# Patient Record
Sex: FEMALE | Race: BLACK OR AFRICAN AMERICAN | Employment: FULL TIME | URBAN - METROPOLITAN AREA
[De-identification: names, ages, dates, MRNs, and addresses within clinical notes are randomized per-mention and may not be internally consistent; named-entity substitution may affect disease eponyms.]

---

## 2020-02-18 PROBLEM — N91.1 SECONDARY AMENORRHEA: Status: ACTIVE | Noted: 2020-02-18

## 2020-02-18 PROBLEM — E04.2 MULTINODULAR GOITER: Status: ACTIVE | Noted: 2020-02-18

## 2020-02-18 PROBLEM — E22.1 HYPERPROLACTINEMIA (HCC): Status: ACTIVE | Noted: 2020-02-18

## 2020-02-18 PROBLEM — D35.2 PITUITARY ADENOMA (HCC): Status: ACTIVE | Noted: 2020-02-18

## 2020-03-03 ENCOUNTER — HOSPITAL ENCOUNTER (OUTPATIENT)
Dept: MRI IMAGING | Age: 30
Discharge: HOME | End: 2020-03-03
Attending: INTERNAL MEDICINE
Payer: COMMERCIAL

## 2020-03-03 DIAGNOSIS — E22.1 HYPERPROLACTINEMIA (HCC): ICD-10-CM

## 2020-03-03 DIAGNOSIS — D35.2 PITUITARY ADENOMA (HCC): ICD-10-CM

## 2020-03-03 PROCEDURE — 74011000258 HC RX REV CODE- 258: Performed by: INTERNAL MEDICINE

## 2020-03-03 PROCEDURE — 74011250636 HC RX REV CODE- 250/636: Performed by: INTERNAL MEDICINE

## 2020-03-03 PROCEDURE — A9575 INJ GADOTERATE MEGLUMI 0.1ML: HCPCS | Performed by: INTERNAL MEDICINE

## 2020-03-03 PROCEDURE — 70553 MRI BRAIN STEM W/O & W/DYE: CPT

## 2020-03-03 RX ORDER — GADOTERATE MEGLUMINE 376.9 MG/ML
17 INJECTION INTRAVENOUS
Status: COMPLETED | OUTPATIENT
Start: 2020-03-03 | End: 2020-03-03

## 2020-03-03 RX ORDER — SODIUM CHLORIDE 0.9 % (FLUSH) 0.9 %
10 SYRINGE (ML) INJECTION
Status: COMPLETED | OUTPATIENT
Start: 2020-03-03 | End: 2020-03-03

## 2020-03-03 RX ADMIN — GADOTERATE MEGLUMINE 17 ML: 376.9 INJECTION INTRAVENOUS at 21:17

## 2020-03-03 RX ADMIN — Medication 10 ML: at 21:16

## 2020-03-03 RX ADMIN — SODIUM CHLORIDE 100 ML: 900 INJECTION, SOLUTION INTRAVENOUS at 21:16

## 2020-03-04 NOTE — PROGRESS NOTES
Her MRI revealed that the pituitary tumor measuring 9 mm. This is slightly larger than her prior study, although I suspect that it will decrease in size with cabergoline treatment.

## 2021-09-29 ENCOUNTER — HOSPITAL ENCOUNTER (OUTPATIENT)
Dept: MRI IMAGING | Age: 31
Discharge: HOME | End: 2021-09-29
Attending: INTERNAL MEDICINE
Payer: COMMERCIAL

## 2021-09-29 DIAGNOSIS — D35.2 PITUITARY ADENOMA (HCC): ICD-10-CM

## 2021-09-29 DIAGNOSIS — E22.1 HYPERPROLACTINEMIA (HCC): ICD-10-CM

## 2021-09-29 PROCEDURE — 74011250636 HC RX REV CODE- 250/636: Performed by: INTERNAL MEDICINE

## 2021-09-29 PROCEDURE — 70553 MRI BRAIN STEM W/O & W/DYE: CPT

## 2021-09-29 PROCEDURE — A9576 INJ PROHANCE MULTIPACK: HCPCS | Performed by: INTERNAL MEDICINE

## 2021-09-29 RX ORDER — SODIUM CHLORIDE 0.9 % (FLUSH) 0.9 %
10 SYRINGE (ML) INJECTION
Status: COMPLETED | OUTPATIENT
Start: 2021-09-29 | End: 2021-09-29

## 2021-09-29 RX ADMIN — GADOTERIDOL 17 ML: 279.3 INJECTION, SOLUTION INTRAVENOUS at 19:56

## 2021-09-29 RX ADMIN — Medication 10 ML: at 19:56

## 2022-01-31 PROBLEM — N91.4 SECONDARY OLIGOMENORRHEA: Status: ACTIVE | Noted: 2022-01-31

## 2022-03-18 PROBLEM — N91.4 SECONDARY OLIGOMENORRHEA: Status: ACTIVE | Noted: 2022-01-31

## 2022-03-19 PROBLEM — E22.1 HYPERPROLACTINEMIA (HCC): Status: ACTIVE | Noted: 2020-02-18

## 2022-03-19 PROBLEM — N91.1 SECONDARY AMENORRHEA: Status: ACTIVE | Noted: 2020-02-18

## 2022-03-19 PROBLEM — E04.2 MULTINODULAR GOITER: Status: ACTIVE | Noted: 2020-02-18

## 2022-03-19 PROBLEM — D35.2 PITUITARY ADENOMA (HCC): Status: ACTIVE | Noted: 2020-02-18

## 2022-05-26 DIAGNOSIS — E22.1 HYPERPROLACTINEMIA (HCC): Primary | ICD-10-CM

## 2022-07-12 DIAGNOSIS — E22.1 HYPERPROLACTINEMIA (HCC): ICD-10-CM

## 2022-07-13 ENCOUNTER — OFFICE VISIT (OUTPATIENT)
Dept: ENDOCRINOLOGY | Age: 32
End: 2022-07-13
Payer: COMMERCIAL

## 2022-07-13 VITALS
WEIGHT: 183 LBS | DIASTOLIC BLOOD PRESSURE: 78 MMHG | HEART RATE: 97 BPM | SYSTOLIC BLOOD PRESSURE: 124 MMHG | BODY MASS INDEX: 27.74 KG/M2 | OXYGEN SATURATION: 98 % | HEIGHT: 68 IN

## 2022-07-13 DIAGNOSIS — D35.2 PITUITARY ADENOMA (HCC): Primary | ICD-10-CM

## 2022-07-13 DIAGNOSIS — E22.1 HYPERPROLACTINEMIA (HCC): ICD-10-CM

## 2022-07-13 DIAGNOSIS — N92.6 IRREGULAR MENSES: ICD-10-CM

## 2022-07-13 DIAGNOSIS — E04.2 MULTINODULAR GOITER: ICD-10-CM

## 2022-07-13 LAB — PROLACTIN SERPL-MCNC: 63.6 NG/ML

## 2022-07-13 PROCEDURE — 99214 OFFICE O/P EST MOD 30 MIN: CPT | Performed by: INTERNAL MEDICINE

## 2022-07-13 RX ORDER — CABERGOLINE 0.5 MG/1
0.5 TABLET ORAL DAILY
Qty: 90 TABLET | Refills: 3 | Status: SHIPPED | OUTPATIENT
Start: 2022-07-13

## 2022-07-13 RX ORDER — FLUTICASONE PROPIONATE 50 MCG
SPRAY, SUSPENSION (ML) NASAL
COMMUNITY
Start: 2021-11-12 | End: 2022-07-13

## 2022-07-13 ASSESSMENT — ENCOUNTER SYMPTOMS
NAUSEA: 0
DIARRHEA: 0
VOMITING: 0
CONSTIPATION: 0

## 2022-07-13 NOTE — PROGRESS NOTES
Mikey Garcia MD, [facility]  [address], [address] [facility], [address] [place] [address] [place]  Phone [phone]  Facsimile [phone]          Paty Singh is a 27 y.o. female seen 7/13/2022 for follow up of hyperprolactinemia        ASSESSMENT AND PLAN:    1. Pituitary adenoma ([address]  Clinically she has a prolactinoma. Briseida Fontanez most recent MRI 9/2021 revealed that the microadenoma was stable since the prior study. Her IGF-1 level was mildly elevated in 2/2020 but was normal 12/2020.    2. Hyperprolactinemia ([address]  Her prolactin level remains elevated and I will increase her cabergoline as below. She is requiring a high dose of cabergoline and still does not have adequate control of her hyperprolactinemia. I have therefore recommended that she strongly consider transsphenoidal resection of her prolactinoma. - cabergoline (DOSTINEX) 0.5 MG tablet; Take 1 tablet by mouth daily  Dispense: 90 tablet; Refill: 3    3. Irregular menses  She stopped her oral contraceptive pills. It would be interesting to see if her menses normalize with correction of her hyperprolactinemia. 4. Multinodular goiter  Thyroid ultrasound performed 9/2021 again revealed the presence of multiple small nodules bilaterally without suspicious sonographic features. I will likely not pursue further imaging unless there is a change in her physical examination and/or symptoms. Follow-up and Dispositions    · Return in about 4 months (around 11/13/2022). HISTORY OF PRESENT ILLNESS:    PITUITARY DISEASE     Presentation/Diagnosis/Treatment: Prolactinoma diagnosed in 2016 during the evaluation of amenorrhea.  She was previously followed by [facility] reproductive endocrinology.     Symptoms: She has a history of migraines and reports occasional headaches.   Denies tunnel vision, diplopia.  Denies galactorrhea. Isha Miguel has a history of amenorrhea.  She started OCPs 8/2020 and was having irregular menses.  She stopped OCPs around 11/2021 and her menses have been irregular (more often). No known history of infertility but she has never tried to get pregnant.  Denies change in facial appearance, change in ring/shoe size.       Treatment: Cabergoline re-started 2/2020.     Imaging:  MRI brain with and without contrast 2/16/2016: Hypoenhancing 9 mm intrasellar lesion to the left of midline located inferiorly, likely an adenoma.  The optic chiasm is normal.  Cavernous sinuses enhance symmetrically. MRI brain with and without contrast 9/27/2016: Lesion in the floor of the sella measuring approximately 7 x 3 mm (compared to 8.5 x 4 mm on previous study). MRI brain with and without contrast 3/4/2020: Hypoenhancing area in the left lateral inferior anterior sella measuring 9 mm. MRI brain with and without contrast 9/29/2021: There is an oval 0.8 cm hyperenhancing focus in the inferior/left aspect of the pituitary gland, unchanged.      Labs:  2/1/2016: Prolactin 241.9, estradiol 9.6, FSH 6.0, LH 5.2, TSH 0.872.  1/20/2020: Prolactin 163.1, creatinine 1.0 (GFR 78). 2/18/2020: Prolactin 153.6, estradiol 20.0, FSH 7.0, LH 9.0, IGF-1 280 (), TSH 0.988, free T4 0.94.  6/24/2020: Prolactin 129.5, beta hCG negative. 12/10/2020: Prolactin 95.5, IGF-1 229.  9/1/2021: Prolactin 113.0.  1/31/2022: Prolactin 19.0.  7/12/2022: Prolactin 63. 6.     Pregnancy status: No pregnancy plans.          THYROID NODULE / MULTINODULAR GOITER     Presentation: Thyromegaly noted on examination.     Thyroid Cancer Risk Deatra Ream is no family history of thyroid cancer. Omelia Sovereign is no history of radiation to the head/neck.      Symptoms:  Denies anterior neck enlargement/pain/pressure.  Denies dysphagia, positional shortness of breath, hoarseness.     Imaging:  Thyroid ultrasound 2/18/2020: Right lobe 1.76 x 2.01 x 6.44 cm, homogeneous echotexture.  In the inferior right lobe there is a complex nodule measuring 0.38 x 0.54 x 0.56 cm.  Just inferiorly is a cyst measuring 0.40 x 0.50 x 0.45 cm.  Isthmus measures 0.63 cm.  Left lobe 1.62 x 1.96 x 6.15 cm, homogeneous echotexture.  In the inferior left lobe posteriorly there is a complex nodule measuring 0.57 x 0.98 x 0.73 cm containing no microcalcifications. Thyroid ultrasound 9/1/2021: Right lobe 1.89 x 2.06 x 6.25 cm, homogeneous echotexture. In the mid to superior right lobe posteriorly there is a complex nodule measuring 0.31 cm (TR 2). In the mid to superior right lobe laterally there is a cyst measuring 0.24 cm (TR 1). In the inferior right lobe there is a cyst containing an isoechoic mural solid component measuring 0.50 x 0.67 x 0.84 cm (TR 2). Isthmus measures 0.55 cm. Left lobe 1.70 x 2.02 x 6.56 cm. In the mid left lobe anteriorly there is a cyst measuring 0.31 cm (TR 1). Just inferiorly is a complex, mostly solid isoechoic nodule measuring 0.34 x 0.43 x 0.57 cm (TR 3). In the inferior left lobe posteriorly there is a complex isoechoic nodule measuring 0.52 x 0.76 x 0.95 cm (TR 2).     Labs:  2/18/2020: TSH 0.988, free T4 0.94.  9/1/2021: TSH 0.762. Review of Systems   Constitutional: Negative for fatigue. Weight decreased 5 pounds since last visit. Gastrointestinal: Negative for constipation, diarrhea, nausea and vomiting. Neurological: Negative for light-headedness. Vital Signs:  /78   Pulse 97   Ht 5' 8\" (1.727 m)   Wt 183 lb (83 kg)   SpO2 98%   BMI 27.83 kg/m²     Wt Readings from Last 3 Encounters:   07/13/22 183 lb (83 kg)   01/31/22 188 lb (85.3 kg)   09/01/21 189 lb 6.4 oz (85.9 kg)       Physical Exam  Constitutional:       General: She is not in acute distress. Neck:      Thyroid: No thyroid mass or thyromegaly. Cardiovascular:      Rate and Rhythm: Normal rate and regular rhythm. Lymphadenopathy:      Cervical: No cervical adenopathy. Neurological:      Motor: No tremor.          Orders Placed This Encounter   Procedures    Prolactin     Standing Status:   Future     Standing Expiration Date:   7/13/2023       Current Outpatient Medications   Medication Sig Dispense Refill    Cholecalciferol (VITAMIN D3 PO) Take by mouth      Ascorbic Acid (VITAMIN C PO) Take by mouth      cabergoline (DOSTINEX) 0.5 MG tablet Take 1 tablet by mouth daily 90 tablet 3    ZINC PO Take by mouth       No current facility-administered medications for this visit.

## 2022-09-07 ENCOUNTER — TELEPHONE (OUTPATIENT)
Dept: ENDOCRINOLOGY | Age: 32
End: 2022-09-07

## 2022-09-07 DIAGNOSIS — E22.1 HYPERPROLACTINEMIA (HCC): ICD-10-CM

## 2022-09-07 DIAGNOSIS — D35.2 PITUITARY ADENOMA (HCC): Primary | ICD-10-CM

## 2022-09-07 NOTE — TELEPHONE ENCOUNTER
Patient stated that she is to see a neurologist with [facility]. She was not referred there and called for a consultation with neurology. She stated that she will need another MRI of the brain with contrast before she can do the consult. She stated that she is considering surgery for her pituitary dx.

## 2022-09-16 DIAGNOSIS — E22.1 HYPERPROLACTINEMIA (HCC): ICD-10-CM

## 2022-09-16 RX ORDER — CABERGOLINE 0.5 MG/1
TABLET ORAL
Qty: 64 TABLET | Refills: 3 | OUTPATIENT
Start: 2022-09-16

## 2022-09-27 ENCOUNTER — HOSPITAL ENCOUNTER (OUTPATIENT)
Dept: MRI IMAGING | Age: 32
Discharge: HOME | End: 2022-09-30
Payer: COMMERCIAL

## 2022-09-27 DIAGNOSIS — D35.2 PITUITARY ADENOMA (HCC): ICD-10-CM

## 2022-09-27 DIAGNOSIS — E22.1 HYPERPROLACTINEMIA (HCC): ICD-10-CM

## 2022-09-27 PROCEDURE — 6360000004 HC RX CONTRAST MEDICATION: Performed by: INTERNAL MEDICINE

## 2022-09-27 PROCEDURE — A9579 GAD-BASE MR CONTRAST NOS,1ML: HCPCS | Performed by: INTERNAL MEDICINE

## 2022-09-27 PROCEDURE — 2580000003 HC RX 258: Performed by: INTERNAL MEDICINE

## 2022-09-27 PROCEDURE — 70553 MRI BRAIN STEM W/O & W/DYE: CPT | Performed by: INTERNAL MEDICINE

## 2022-09-27 RX ORDER — SODIUM CHLORIDE 0.9 % (FLUSH) 0.9 %
30 SYRINGE (ML) INJECTION AS NEEDED
Status: DISCONTINUED | OUTPATIENT
Start: 2022-09-27 | End: 2022-10-01 | Stop reason: HOSPADM

## 2022-09-27 RX ADMIN — SODIUM CHLORIDE, PRESERVATIVE FREE 30 ML: 5 INJECTION INTRAVENOUS at 09:42

## 2022-09-27 RX ADMIN — GADOTERIDOL 16 ML: 279.3 INJECTION, SOLUTION INTRAVENOUS at 09:41

## 2023-07-19 ENCOUNTER — OFFICE VISIT (OUTPATIENT)
Dept: ENDOCRINOLOGY | Age: 33
End: 2023-07-19
Payer: COMMERCIAL

## 2023-07-19 VITALS
OXYGEN SATURATION: 99 % | WEIGHT: 176 LBS | DIASTOLIC BLOOD PRESSURE: 78 MMHG | SYSTOLIC BLOOD PRESSURE: 110 MMHG | HEART RATE: 52 BPM | BODY MASS INDEX: 26.76 KG/M2

## 2023-07-19 DIAGNOSIS — E04.2 MULTINODULAR GOITER: ICD-10-CM

## 2023-07-19 DIAGNOSIS — D35.2 PITUITARY ADENOMA (HCC): Primary | ICD-10-CM

## 2023-07-19 DIAGNOSIS — E22.1 HYPERPROLACTINEMIA (HCC): ICD-10-CM

## 2023-07-19 PROBLEM — N92.6 IRREGULAR MENSES: Status: RESOLVED | Noted: 2022-07-13 | Resolved: 2023-07-19

## 2023-07-19 LAB — PROLACTIN SERPL-MCNC: 74.4 NG/ML

## 2023-07-19 PROCEDURE — 99214 OFFICE O/P EST MOD 30 MIN: CPT | Performed by: INTERNAL MEDICINE

## 2023-07-19 ASSESSMENT — ENCOUNTER SYMPTOMS
CONSTIPATION: 0
DIARRHEA: 0
VOMITING: 0
NAUSEA: 0

## 2023-07-19 NOTE — PROGRESS NOTES
Mikey Sloan MD, [facility]  [address] [address], [address]  Phone [phone]  Facsimile [phone]          Joie Fabry is a 26 y.o. female seen 7/19/2023 for follow up of hyperprolactinemia        ASSESSMENT AND PLAN:    1. Pituitary adenoma ([address])  Clinically she has a prolactinoma. Her most recent MRI 9/2022 revealed that the microadenoma was stable in size. Her IGF-1 level was mildly elevated in 2/2020 but was normal 12/2020. She has been off cabergoline for approximately 7 to 8 months. I will therefore repeat a pituitary protocol MRI to ensure that the tumor has not enlarged. 2. Hyperprolactinemia ([address])  Her prolactin level remained elevated despite treatment with high-dose cabergoline. I referred her to Dr. Marlena Castaneda for consideration of transsphenoidal resection of prolactinoma, but they elected to continue observation given her lack of symptoms. She has been off cabergoline for approximately 7 to 8 months and I will repeat her prolactin level at this time. 3. Multinodular goiter  Thyroid ultrasound performed 9/2021 again revealed the presence of multiple small nodules bilaterally without suspicious sonographic features. I will likely not pursue further imaging unless there is a change in her physical examination and/or symptoms. Follow-up and Dispositions    Return in about 6 months (around 1/19/2024). HISTORY OF PRESENT ILLNESS:    PITUITARY DISEASE     Presentation/Diagnosis/Treatment: Prolactinoma diagnosed in 2016 during the evaluation of amenorrhea. She was previously followed by [facility] reproductive endocrinology. Symptoms: She has a history of migraines and reports occasional headaches. Denies tunnel vision, diplopia. Denies galactorrhea. She has a history of amenorrhea. She started OCPs 8/2020 and was having irregular menses. She stopped OCPs around 11/2021.   Her menses have been regular

## 2024-01-17 ENCOUNTER — HOSPITAL ENCOUNTER (OUTPATIENT)
Dept: MRI IMAGING | Age: 34
Discharge: HOME | End: 2024-01-20
Attending: INTERNAL MEDICINE
Payer: COMMERCIAL

## 2024-01-17 DIAGNOSIS — D35.2 PITUITARY ADENOMA (HCC): ICD-10-CM

## 2024-01-17 DIAGNOSIS — E22.1 HYPERPROLACTINEMIA (HCC): ICD-10-CM

## 2024-01-17 PROCEDURE — 2580000003 HC RX 258: Performed by: INTERNAL MEDICINE

## 2024-01-17 PROCEDURE — 70553 MRI BRAIN STEM W/O & W/DYE: CPT | Performed by: INTERNAL MEDICINE

## 2024-01-17 PROCEDURE — 6360000004 HC RX CONTRAST MEDICATION: Performed by: INTERNAL MEDICINE

## 2024-01-17 PROCEDURE — A9579 GAD-BASE MR CONTRAST NOS,1ML: HCPCS | Performed by: INTERNAL MEDICINE

## 2024-01-17 RX ORDER — SODIUM CHLORIDE 0.9 % (FLUSH) 0.9 %
40 SYRINGE (ML) INJECTION AS NEEDED
Status: DISCONTINUED | OUTPATIENT
Start: 2024-01-17 | End: 2024-01-21 | Stop reason: HOSPADM

## 2024-01-17 RX ADMIN — GADOTERIDOL 17 ML: 279.3 INJECTION, SOLUTION INTRAVENOUS at 10:09

## 2024-01-17 RX ADMIN — SODIUM CHLORIDE, PRESERVATIVE FREE 40 ML: 5 INJECTION INTRAVENOUS at 10:09

## 2024-02-14 DIAGNOSIS — E22.1 HYPERPROLACTINEMIA (HCC): ICD-10-CM

## 2024-02-14 LAB — PROLACTIN SERPL-MCNC: 71.7 NG/ML

## 2024-02-15 ENCOUNTER — OFFICE VISIT (OUTPATIENT)
Dept: ENDOCRINOLOGY | Age: 34
End: 2024-02-15
Payer: COMMERCIAL

## 2024-02-15 VITALS
BODY MASS INDEX: 27.22 KG/M2 | DIASTOLIC BLOOD PRESSURE: 72 MMHG | SYSTOLIC BLOOD PRESSURE: 100 MMHG | HEART RATE: 97 BPM | OXYGEN SATURATION: 99 % | WEIGHT: 179 LBS

## 2024-02-15 DIAGNOSIS — D35.2 PITUITARY ADENOMA (HCC): Primary | ICD-10-CM

## 2024-02-15 DIAGNOSIS — E22.1 HYPERPROLACTINEMIA (HCC): ICD-10-CM

## 2024-02-15 DIAGNOSIS — E04.2 MULTINODULAR GOITER: ICD-10-CM

## 2024-02-15 PROCEDURE — 99214 OFFICE O/P EST MOD 30 MIN: CPT | Performed by: INTERNAL MEDICINE

## 2024-02-15 NOTE — PROGRESS NOTES
lb)       Physical Exam  Constitutional:       General: She is not in acute distress.  Neck:      Thyroid: No thyroid mass or thyromegaly.   Cardiovascular:      Rate and Rhythm: Normal rate and regular rhythm.   Lymphadenopathy:      Cervical: No cervical adenopathy.   Neurological:      Motor: No tremor.         Orders Placed This Encounter   Procedures    Prolactin     Standing Status:   Future     Standing Expiration Date:   8/15/2025       No current outpatient medications on file.     No current facility-administered medications for this visit.

## 2025-02-14 ENCOUNTER — TELEPHONE (OUTPATIENT)
Dept: ENDOCRINOLOGY | Age: 35
End: 2025-02-14

## 2025-02-14 NOTE — TELEPHONE ENCOUNTER
Tried to return call to patient to let her know she does have a lab order for her appt with Dr. Roberto on Monday.  Patient's phone was not working and could not leave a message.

## 2025-02-17 ENCOUNTER — OFFICE VISIT (OUTPATIENT)
Dept: ENDOCRINOLOGY | Age: 35
End: 2025-02-17
Payer: COMMERCIAL

## 2025-02-17 VITALS
WEIGHT: 190 LBS | HEIGHT: 67 IN | HEART RATE: 77 BPM | BODY MASS INDEX: 29.82 KG/M2 | OXYGEN SATURATION: 99 % | DIASTOLIC BLOOD PRESSURE: 65 MMHG | SYSTOLIC BLOOD PRESSURE: 115 MMHG

## 2025-02-17 DIAGNOSIS — E22.1 HYPERPROLACTINEMIA: ICD-10-CM

## 2025-02-17 DIAGNOSIS — E04.2 MULTINODULAR GOITER: ICD-10-CM

## 2025-02-17 DIAGNOSIS — D35.2 PITUITARY ADENOMA (HCC): Primary | ICD-10-CM

## 2025-02-17 LAB — PROLACTIN SERPL-MCNC: 80.1 NG/ML (ref 4.8–23.3)

## 2025-02-17 PROCEDURE — 99214 OFFICE O/P EST MOD 30 MIN: CPT | Performed by: INTERNAL MEDICINE

## 2025-02-17 ASSESSMENT — ENCOUNTER SYMPTOMS
CONSTIPATION: 0
NAUSEA: 0
VOMITING: 0
DIARRHEA: 0

## 2025-02-17 NOTE — PROGRESS NOTES
Mikey Roberto MD, [facility]  [address] [place] [address], [address]  [place] [address]  Phone [phone]  Facsimile [phone]          Khushboo Hill is a 34 y.o. female seen 2/17/2025 for follow up of hyperprolactinemia        ASSESSMENT AND PLAN:    1. Pituitary adenoma ([facility])  Clinically she has a prolactinoma.  Her most recent MRI 1/2024 revealed that the microadenoma was stable in size.  Her IGF-1 level was mildly elevated in 2/2020 but was normal 12/2020.  She has been off cabergoline since 11/2022.  I will repeat a pituitary protocol MRI in 1-2 years to document stability.    2. Hyperprolactinemia ([facility])  Her prolactin level remained elevated despite treatment with high-dose cabergoline.  I referred her to Dr. Hernandez for consideration of transsphenoidal resection of prolactinoma, but they elected to continue observation given her lack of symptoms.  She has been off cabergoline and her prolactin level has been stable.  I will check her prolactin level today.    3. Multinodular goiter  Thyroid ultrasound performed 9/2021 again revealed the presence of multiple small nodules bilaterally without suspicious sonographic features.  I will likely not pursue further imaging unless there is a change in her physical examination and/or symptoms.      Follow-up and Dispositions    Return in about 1 year (around 2/17/2026).         HISTORY OF PRESENT ILLNESS:    PITUITARY DISEASE     Presentation/Diagnosis/Treatment: Prolactinoma diagnosed in 2016 during the evaluation of amenorrhea.  She was previously followed by [facility] reproductive endocrinology.     Symptoms: She has a history of migraines and reports occasional headaches.  Denies tunnel vision, diplopia.  Denies galactorrhea.  She has a history of amenorrhea.  She started OCPs 8/2020 and was having irregular menses.  She stopped OCPs around 11/2021.  Her menses have been regular.  No known history of infertility but she

## 2025-06-23 ENCOUNTER — HOSPITAL ENCOUNTER (EMERGENCY)
Age: 35
Discharge: HOME | End: 2025-06-24
Attending: GENERAL PRACTICE
Payer: COMMERCIAL

## 2025-06-23 DIAGNOSIS — T78.40XA ALLERGIC REACTION, INITIAL ENCOUNTER: Primary | ICD-10-CM

## 2025-06-23 PROCEDURE — 99284 EMERGENCY DEPT VISIT MOD MDM: CPT

## 2025-06-23 ASSESSMENT — PAIN - FUNCTIONAL ASSESSMENT: PAIN_FUNCTIONAL_ASSESSMENT: 0-10

## 2025-06-23 ASSESSMENT — PAIN SCALES - GENERAL: PAINLEVEL_OUTOF10: 0

## 2025-06-24 VITALS
WEIGHT: 188 LBS | BODY MASS INDEX: 28.49 KG/M2 | SYSTOLIC BLOOD PRESSURE: 125 MMHG | RESPIRATION RATE: 16 BRPM | HEIGHT: 68 IN | HEART RATE: 78 BPM | TEMPERATURE: 98.1 F | DIASTOLIC BLOOD PRESSURE: 80 MMHG | OXYGEN SATURATION: 99 %

## 2025-06-24 PROCEDURE — 6360000002 HC RX W HCPCS: Performed by: GENERAL PRACTICE

## 2025-06-24 PROCEDURE — 96372 THER/PROPH/DIAG INJ SC/IM: CPT

## 2025-06-24 RX ORDER — DEXAMETHASONE SODIUM PHOSPHATE 10 MG/ML
10 INJECTION, SOLUTION INTRA-ARTICULAR; INTRALESIONAL; INTRAMUSCULAR; INTRAVENOUS; SOFT TISSUE ONCE
Status: COMPLETED | OUTPATIENT
Start: 2025-06-24 | End: 2025-06-24

## 2025-06-24 RX ADMIN — DEXAMETHASONE SODIUM PHOSPHATE 10 MG: 10 INJECTION INTRAMUSCULAR; INTRAVENOUS at 00:48

## 2025-06-24 NOTE — ED PROVIDER NOTES
Normocephalic and atraumatic.      Nose: Nose normal.      Mouth/Throat:      Mouth: Mucous membranes are moist.   Eyes:      Extraocular Movements: Extraocular movements intact.      Pupils: Pupils are equal, round, and reactive to light.      Comments: Mild right upper eyelid swelling.   Cardiovascular:      Rate and Rhythm: Normal rate and regular rhythm.      Heart sounds: Normal heart sounds.   Pulmonary:      Effort: No respiratory distress.      Breath sounds: No wheezing.   Abdominal:      General: Abdomen is flat.      Palpations: Abdomen is soft.      Tenderness: There is no abdominal tenderness.   Musculoskeletal:         General: No swelling or tenderness.      Cervical back: Normal range of motion.   Skin:     Findings: No erythema or rash.   Neurological:      General: No focal deficit present.      Mental Status: She is oriented to person, place, and time.   Psychiatric:         Mood and Affect: Mood normal.         Behavior: Behavior normal.        Procedures     Procedures    No orders of the defined types were placed in this encounter.       Medications given during this emergency department visit:  Medications   dexAMETHasone (DECADRON) injection 10 mg (has no administration in time range)       New Prescriptions    No medications on file        Past Medical History:   Diagnosis Date    Hyperprolactinemia     Migraine         Past Surgical History:   Procedure Laterality Date    WISDOM TOOTH EXTRACTION  2019        Social History     Socioeconomic History    Marital status: Single     Spouse name: None    Number of children: None    Years of education: None    Highest education level: None   Tobacco Use    Smoking status: Never    Smokeless tobacco: Never   Vaping Use    Vaping status: Never Used   Substance and Sexual Activity    Alcohol use: Yes    Drug use: Never    Sexual activity: Yes     Partners: Male     Birth control/protection: Pill     Social Drivers of Health     Social Connections:

## 2025-06-24 NOTE — ED NOTES
Patient mobility status ambulates with no difficulty.     I have reviewed discharge instructions with the patient.  The patient verbalized understanding.    Patient left ED via Discharge Method: ambulatory to Home with self.    Opportunity for questions and clarification provided.     Patient given 0 scripts.

## 2025-06-24 NOTE — ED TRIAGE NOTES
Pt to ED with c/o right eye swelling. Pt states allergic reaction. Pt states accidentally used a latex resistance band earlier at this gym and then touched her eye. PT denies any throat swelling itching or difficulty breathing. Pt denies any irritation to eye or changes in her vision. Pt states took 2 benadryl earlier. Pt alert and in no acute distress at this time.